# Patient Record
Sex: MALE
[De-identification: names, ages, dates, MRNs, and addresses within clinical notes are randomized per-mention and may not be internally consistent; named-entity substitution may affect disease eponyms.]

---

## 2023-03-11 ENCOUNTER — NURSE TRIAGE (OUTPATIENT)
Dept: OTHER | Facility: CLINIC | Age: 33
End: 2023-03-11

## 2023-03-11 NOTE — TELEPHONE ENCOUNTER
Location of patient: [place]    Subjective: Caller states \"He was diagnosed with a kidney stone in the ER\"     Current Symptoms: Has just took a pain pill but in a lot of pain. Patient crying in pain in the background during the call. Associated Symptoms: reduced activity    Pain Severity: 10+/10; patient crying in pain in the background; severe, excruciating    Temperature: denies fever     What has been tried: prescribed pain pill    Recommended disposition: Go to ED Now    Care advice provided, patient verbalizes understanding; denies any other questions or concerns.     Outcome: Patient/caller agrees to proceed to   Emergency Department      This triage is a result of a call to the [address] [place] [address]    Reason for Disposition   [1] SEVERE pain (e.g., excruciating, scale 8-10) AND [2] present > 1 hour    Protocols used: [facility] Pain-ADULT-

## 2023-03-12 ENCOUNTER — NURSE TRIAGE (OUTPATIENT)
Dept: OTHER | Facility: CLINIC | Age: 33
End: 2023-03-12

## 2023-03-12 NOTE — TELEPHONE ENCOUNTER
Location of patient: [place]    Subjective: Caller states \"Had surgery yesterday for a kidney stone\"     Current Symptoms: Had the kidney stone blasted and put a stent in. No BM in 2 full days. She is at the store to get him a laxative as he is uncomfortable due to no recent BM. No vomiting. Associated Symptoms: reduced activity    Pain Severity: constant    Temperature: denies fever     Recommended disposition: See HCP (Or PCP Triage) Within 4 Hours    Care advice provided, patient verbalizes understanding; denies any other questions or concerns. Outcome: Patient/caller agrees to proceed to nearest [facility] . Caller does not have name and number of surgeon since was done at hospital on the weekend. Patient does not have a PCP. Advised to go to [facility] for evaluation and they will be able to advise on which OTC stool softener/laxatives that he can take since is post op. Caller aware triage RN is unable to give advice on OTC medications for the state of [place]. She did agree to take him to [facility] today.        This triage is a result of a call to the [address] [place] [address]    Reason for Disposition   Constipation   [1] Constant abdominal pain AND [2] present > 2 hours    Protocols used: Post-Op Symptoms and Questions-ADULT-AH, Constipation-ADULT-AH